# Patient Record
Sex: FEMALE | Race: WHITE | Employment: STUDENT | URBAN - NONMETROPOLITAN AREA
[De-identification: names, ages, dates, MRNs, and addresses within clinical notes are randomized per-mention and may not be internally consistent; named-entity substitution may affect disease eponyms.]

---
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
## 2019-01-30 ENCOUNTER — TELEPHONE (OUTPATIENT)
Dept: FAMILY MEDICINE CLINIC | Age: 19
End: 2019-01-30

## 2019-01-30 DIAGNOSIS — F32.A DEPRESSION, UNSPECIFIED DEPRESSION TYPE: Primary | ICD-10-CM

## 2019-04-04 ENCOUNTER — OFFICE VISIT (OUTPATIENT)
Dept: FAMILY MEDICINE CLINIC | Age: 19
End: 2019-04-04
Payer: COMMERCIAL

## 2019-04-04 ENCOUNTER — TELEPHONE (OUTPATIENT)
Dept: FAMILY MEDICINE CLINIC | Age: 19
End: 2019-04-04

## 2019-04-04 VITALS
DIASTOLIC BLOOD PRESSURE: 56 MMHG | SYSTOLIC BLOOD PRESSURE: 106 MMHG | WEIGHT: 117.5 LBS | TEMPERATURE: 97.8 F | RESPIRATION RATE: 16 BRPM | HEART RATE: 92 BPM | BODY MASS INDEX: 19.34 KG/M2

## 2019-04-04 DIAGNOSIS — L55.1 SUNBURN, BLISTERING: Primary | ICD-10-CM

## 2019-04-04 PROCEDURE — 99213 OFFICE O/P EST LOW 20 MIN: CPT | Performed by: NURSE PRACTITIONER

## 2019-04-04 ASSESSMENT — ENCOUNTER SYMPTOMS
BLOOD IN STOOL: 0
SHORTNESS OF BREATH: 0
DIARRHEA: 0
CONSTIPATION: 0
NAUSEA: 0
VOMITING: 0

## 2019-04-04 NOTE — TELEPHONE ENCOUNTER
Patient's mother Isabell Lopes called. States that she got sunburned on vacation last Friday and she still has swelling and blisters on her feet and legs, especially the feet. She has been treating it with aloe with no success. Is there anything you could send in for her or what do you recommend? She uses [facility] in [address] [place] [address]  Please call mother back at [phone].

## 2019-04-04 NOTE — PATIENT INSTRUCTIONS
Ibuprofen 600mg four times daily. Take with food. Patient Education        Sunburn: Care Instructions  Your Care Instructions  A sunburn is skin damage from the sun's ultraviolet (UV) rays. Most sunburns cause mild pain and redness but affect only the outer layer of skin. These are called first-degree burns. The red skin might hurt when you touch it. These sunburns are mild and can usually be treated at home. Skin that is red and painful and that swells up and blisters may mean that deep skin layers and nerve endings have been damaged. These are second-degree burns. This type of sunburn is usually more painful and takes longer to heal.  Follow-up care is a key part of your treatment and safety. Be sure to make and go to all appointments, and call your doctor if you are having problems. It's also a good idea to know your test results and keep a list of the medicines you take. How can you care for yourself at home? · Use cool cloths on the sunburned areas. · Take cool showers or baths often. · Apply soothing lotions with aloe vera to sunburned areas. Do not apply lotions to blistered skin. · A sunburn can cause a mild fever and a headache. Lie down in a cool, quiet room to relieve the headache. A headache may be caused by not getting enough fluids, which is called dehydration, so drinking fluids may help. · Take anti-inflammatory medicines to reduce pain, swelling, and fever. These include ibuprofen (Advil, Motrin) and naproxen (Aleve). Read and follow all instructions on the label. · Use lotion to relieve the itching when your skin peels. There is nothing you can do to stop skin from peeling after a sunburn. It is part of the healing process. · Protect your skin from the sun with sunscreen, hats with wide brims, sunglasses, and loose-fitting, tightly woven clothing that covers your arms and legs. Caring for blisters  Small blisters usually heal on their own. · Do not try to break the blisters.  Just leave them alone. · Do not cover the blisters unless something such as clothing is rubbing against them. If you do cover them, apply a loose bandage. You can use tape to hold the bandage on, but do not let the tape touch the blisters. · Avoid wearing clothes or shoes or doing activities that rub or irritate the blisters until they have healed. Larger blisters, which are the size of a nickel or larger, usually heal without problems. · If you have a large blister, you can consider draining it, unless your doctor told you not to. Clean a needle with rubbing alcohol or soap and water, then use it to gently puncture the edge of the blister. Press the fluid in the blister toward the hole you made. Wash the blister after you have drained it, and pat it dry with clean gauze. · Do not remove the flap of skin covering the blister unless it tears or gets dirty or pus forms under it. The flap protects the healing skin underneath. · Put a thin layer of petroleum jelly on the bandage before you apply it. This will keep the bandage from sticking to the blister. Do not use alcohol or iodine on the blister because these may make the blister heal more slowly. · Loosely apply a bandage or gauze. You can use tape to hold the bandage on, but do not let the tape touch the blister. Do not wrap tape completely around a hand, arm, foot, or leg because it could cut off the blood supply if the limb swells. If the tape is too tight, you may develop numbness, tingling, pain, or cool and pale or swollen skin below the tape. · Change the bandage every day and any time it gets wet or dirty. You can soak the bandage in cool water just before removing it to make it less painful to take off. · Avoid wearing clothes or shoes or doing activities that rub or irritate the blisters until they have healed. When should you call for help? Call your doctor now or seek immediate medical care if:    · You have signs of needing more fluids.  You have sunken eyes and a dry mouth, and you pass only a little dark urine.     · You have signs of infection, such as:  ? Increased pain, swelling, warmth, or redness. ? Red streaks leading from the area. ? Pus draining from the area. ? A fever.    Watch closely for changes in your health, and be sure to contact your doctor if:    · You do not get better as expected. Where can you learn more? Go to https://[employer]. [employer] [place]. org and sign in to your [employer] account. Enter K470 in the [employer] box to learn more about \"Sunburn: Care Instructions. \"     If you do not have an account, please click on the \"Sign Up Now\" link. Current as of: September 23, 2018  Content Version: 11.9  © 5899-5358 [employer]. Care instructions adapted under license by [facility] ([facility]). If you have questions about a medical condition or this instruction, always ask your healthcare professional. [place] [address] any warranty or liability for your use of this information. Patient Education        Learning About Sun Damage and Your Skin  How does the sun affect your skin? Being out in the sun can be good for you. Sunlight can brighten your mood and help you feel healthier. But getting too much of the ultraviolet (UV) rays in sunlight or from a tanning bed can harm your skin. The damage may include:  · Skin changes like early wrinkling, sagging skin, and age spots. · Skin cancer. The lighter your skin is, the more easily it can be damaged by the sun. But everyone can benefit from protecting their skin from the sun, regardless of skin color. The amount of skin damage you can get from sunlight depends on:  · The time of day. You are more likely to get a sunburn in the middle of the day. You might think that exposure to the sun isn't a problem on cloudy days, but the sun's UV rays can still pass through clouds.   · Whether you are near reflective surfaces, such as water, white sand, concrete,

## 2019-04-04 NOTE — PROGRESS NOTES
Chief Complaint   Patient presents with    Sunburn     was in [place]. Ericka Hawkins is a 20 y. o.female      Pt complains of Sunburn from [place] over the weekend. The only area she is concerned with are her lower legs from mid shin to toes; Right>left. Has tried Aloe and 200mg ibuprofen at home with minimal relief. Pain 6/10. Worse in the am after sleeping all night. Some blisters, none have popped open. Has had some chills, no fever. Review of Systems   Constitutional: Negative for chills, diaphoresis and fever. Respiratory: Negative for shortness of breath. Cardiovascular: Negative for chest pain, palpitations and leg swelling. Gastrointestinal: Negative for blood in stool, constipation, diarrhea, nausea and vomiting. Genitourinary: Negative for dysuria and hematuria. Musculoskeletal: Negative for myalgias. Skin:        Sunburn to bilat lower extremities mid shin to toes   Neurological: Negative for dizziness and headaches. All other systems reviewed and are negative. OBJECTIVE     BP (!) 106/56   Pulse 92   Temp 97.8 °F (36.6 °C) (Temporal)   Resp 16   Wt 117 lb 8 oz (53.3 kg)   BMI 19.34 kg/m²     Physical Exam   Constitutional: She is oriented to person, place, and time. She appears well-developed and well-nourished. HENT:   Head: Normocephalic and atraumatic. Right Ear: External ear normal.   Left Ear: External ear normal.   Nose: Nose normal.   Mouth/Throat: Oropharynx is clear and moist.   Eyes: Pupils are equal, round, and reactive to light. Conjunctivae and EOM are normal.   Neck: Normal range of motion. Neck supple. Cardiovascular: Normal rate, regular rhythm, normal heart sounds and intact distal pulses. Pulmonary/Chest: Effort normal and breath sounds normal.   Abdominal: Soft. Bowel sounds are normal.   Musculoskeletal: Normal range of motion. Neurological: She is alert and oriented to person, place, and time.  She has normal reflexes. Skin: Skin is warm and dry. Psychiatric: She has a normal mood and affect. Her behavior is normal. Judgment and thought content normal.   Nursing note and vitals reviewed. ASSESSMENT      1. Sunburn, blistering        PLAN     Ibuprofen 600mg four times daily. Take with food. Can continue to aloe at home  Leave blisters intact   Call if you develope fever, more blister develop, intact blister opens and drains purulent fluid.            Electronically signed by KANIKA Martinez CNP on 4/4/2019 at 4:13 PM

## 2019-08-29 ENCOUNTER — TELEPHONE (OUTPATIENT)
Dept: FAMILY MEDICINE CLINIC | Age: 19
End: 2019-08-29

## 2019-08-30 ENCOUNTER — OFFICE VISIT (OUTPATIENT)
Dept: FAMILY MEDICINE CLINIC | Age: 19
End: 2019-08-30
Payer: COMMERCIAL

## 2019-08-30 VITALS
RESPIRATION RATE: 12 BRPM | WEIGHT: 112.6 LBS | SYSTOLIC BLOOD PRESSURE: 98 MMHG | HEART RATE: 60 BPM | HEIGHT: 66 IN | DIASTOLIC BLOOD PRESSURE: 60 MMHG | BODY MASS INDEX: 18.09 KG/M2

## 2019-08-30 DIAGNOSIS — Z00.00 ANNUAL PHYSICAL EXAM: Primary | ICD-10-CM

## 2019-08-30 PROCEDURE — 99395 PREV VISIT EST AGE 18-39: CPT | Performed by: FAMILY MEDICINE

## 2019-08-30 ASSESSMENT — ENCOUNTER SYMPTOMS
GASTROINTESTINAL NEGATIVE: 1
COUGH: 0
BACK PAIN: 0
SHORTNESS OF BREATH: 0

## 2019-08-30 ASSESSMENT — PATIENT HEALTH QUESTIONNAIRE - PHQ9
SUM OF ALL RESPONSES TO PHQ QUESTIONS 1-9: 0
SUM OF ALL RESPONSES TO PHQ9 QUESTIONS 1 & 2: 0
SUM OF ALL RESPONSES TO PHQ QUESTIONS 1-9: 0
1. LITTLE INTEREST OR PLEASURE IN DOING THINGS: 0
2. FEELING DOWN, DEPRESSED OR HOPELESS: 0

## 2019-09-03 ENCOUNTER — NURSE ONLY (OUTPATIENT)
Dept: FAMILY MEDICINE CLINIC | Age: 19
End: 2019-09-03
Payer: COMMERCIAL

## 2019-09-03 DIAGNOSIS — Z11.1 SCREENING EXAMINATION FOR PULMONARY TUBERCULOSIS: Primary | ICD-10-CM

## 2019-09-03 PROCEDURE — 86580 TB INTRADERMAL TEST: CPT | Performed by: FAMILY MEDICINE

## 2019-09-05 LAB
INDURATION: NORMAL
TB SKIN TEST: NEGATIVE

## 2020-04-08 ENCOUNTER — HOSPITAL ENCOUNTER (EMERGENCY)
Age: 20
Discharge: HOME | End: 2020-04-08
Payer: COMMERCIAL

## 2020-04-08 ENCOUNTER — NURSE TRIAGE (OUTPATIENT)
Dept: OTHER | Facility: CLINIC | Age: 20
End: 2020-04-08

## 2020-04-08 VITALS
HEART RATE: 140 BPM | SYSTOLIC BLOOD PRESSURE: 125 MMHG | RESPIRATION RATE: 16 BRPM | WEIGHT: 116.2 LBS | BODY MASS INDEX: 18.68 KG/M2 | HEIGHT: 66 IN | TEMPERATURE: 102.4 F | OXYGEN SATURATION: 96 % | DIASTOLIC BLOOD PRESSURE: 60 MMHG

## 2020-04-08 LAB
FLU A ANTIGEN: NEGATIVE
FLU B ANTIGEN: NEGATIVE
GROUP A STREP CULTURE, REFLEX: NEGATIVE
REFLEX THROAT C + S: NORMAL

## 2020-04-08 PROCEDURE — 87880 STREP A ASSAY W/OPTIC: CPT

## 2020-04-08 PROCEDURE — 99213 OFFICE O/P EST LOW 20 MIN: CPT | Performed by: NURSE PRACTITIONER

## 2020-04-08 PROCEDURE — 87804 INFLUENZA ASSAY W/OPTIC: CPT

## 2020-04-08 PROCEDURE — 87070 CULTURE OTHR SPECIMN AEROBIC: CPT

## 2020-04-08 PROCEDURE — 99214 OFFICE O/P EST MOD 30 MIN: CPT

## 2020-04-08 RX ORDER — ACETAMINOPHEN 500 MG
1000 TABLET ORAL EVERY 6 HOURS PRN
COMMUNITY
End: 2020-07-30

## 2020-04-08 ASSESSMENT — PAIN SCALES - GENERAL: PAINLEVEL_OUTOF10: 8

## 2020-04-08 ASSESSMENT — ENCOUNTER SYMPTOMS
VOMITING: 0
NAUSEA: 0
RHINORRHEA: 0
DIARRHEA: 0
COUGH: 0
SHORTNESS OF BREATH: 0
SORE THROAT: 0
SINUS PRESSURE: 0

## 2020-04-08 ASSESSMENT — PAIN DESCRIPTION - DESCRIPTORS: DESCRIPTORS: ACHING

## 2020-04-08 ASSESSMENT — PAIN DESCRIPTION - PAIN TYPE: TYPE: ACUTE PAIN

## 2020-04-08 ASSESSMENT — PAIN DESCRIPTION - FREQUENCY: FREQUENCY: INTERMITTENT

## 2020-04-08 ASSESSMENT — PAIN DESCRIPTION - LOCATION: LOCATION: BACK

## 2020-04-08 NOTE — ED PROVIDER NOTES
unspecified 1999, 1999, 1999, 01/18/2000    Influenza 10/31/2011, 12/21/2012, 12/09/2013    MMR 01/18/2000, 12/02/2003    Meningococcal MCV4P (Menactra) 07/21/2014, 08/29/2016    PPD Test 08/15/2016, 09/03/2019    Polio IPV (IPOL) 1999, 1999, 1999, 12/02/2003    Tdap (Boostrix, Adacel) 07/21/2014       FAMILY HISTORY     Patient's family history includes Cancer in her maternal grandmother; Diabetes in her sister. SOCIAL HISTORY     Patient  reports that she has never smoked. She has never used smokeless tobacco. She reports that she does not drink alcohol or use drugs. PHYSICAL EXAM     ED TRIAGE VITALS  BP: 125/60, Temp: 102.4 °F (39.1 °C), Pulse: 140, Resp: 16, SpO2: 96 %,Estimated body mass index is 18.76 kg/m² as calculated from the following:    Height as of this encounter: 5' 6\" (1.676 m). Weight as of this encounter: 116 lb 3.2 oz (52.7 kg). ,Patient's last menstrual period was 04/01/2020. Physical Exam  Vitals signs and nursing note reviewed. Constitutional:       General: She is not in acute distress. Appearance: She is well-developed. She is ill-appearing. She is not toxic-appearing. HENT:      Head: Normocephalic and atraumatic. Right Ear: Tympanic membrane and ear canal normal.      Left Ear: Tympanic membrane and ear canal normal.      Nose: Nose normal.      Right Sinus: No maxillary sinus tenderness or frontal sinus tenderness. Left Sinus: No maxillary sinus tenderness or frontal sinus tenderness. Mouth/Throat:      Lips: Pink. Mouth: Mucous membranes are moist.      Pharynx: Uvula midline. Posterior oropharyngeal erythema (Mild) present. No pharyngeal swelling or oropharyngeal exudate. Eyes:      General: Lids are normal. No scleral icterus. Conjunctiva/sclera:      Right eye: Right conjunctiva is not injected. Left eye: Left conjunctiva is not injected.       Pupils: Pupils are equal.   Cardiovascular: Rate and Rhythm: Regular rhythm. Tachycardia present. Heart sounds: Normal heart sounds, S1 normal and S2 normal.   Pulmonary:      Effort: Pulmonary effort is normal. No respiratory distress. Breath sounds: Normal breath sounds and air entry. Musculoskeletal:      Comments: Normal active ROM x 4 extremities  Gait steady   Lymphadenopathy:      Comments: No head or neck adenopathy   Skin:     General: Skin is warm and dry. Findings: No rash (to exposed areas of skin). Nails: There is no clubbing. Neurological:      General: No focal deficit present. Mental Status: She is alert and oriented to person, place, and time. Sensory: No sensory deficit. Comments: Answers questions readily and appropriately   Psychiatric:         Mood and Affect: Mood normal.         Speech: Speech normal.         Behavior: Behavior normal. Behavior is cooperative. DIAGNOSTIC RESULTS     Labs:  Results for orders placed or performed during the hospital encounter of 04/08/20   Strep A culture, throat   Result Value Ref Range    REFLEX THROAT C + S INDICATED    Rapid influenza A/B antigens   Result Value Ref Range    Flu A Antigen NEGATIVE NEGATIVE    Flu B Antigen NEGATIVE NEGATIVE   STREP A ANTIGEN   Result Value Ref Range    GROUP A STREP CULTURE, REFLEX NEGATIVE        IMAGING:    No orders to display         URGENT CARE COURSE:     Vitals:    04/08/20 1541   BP: 125/60   Pulse: 140   Resp: 16   Temp: 102.4 °F (39.1 °C)   TempSrc: Oral   SpO2: 96%   Weight: 116 lb 3.2 oz (52.7 kg)   Height: 5' 6\" (1.676 m)       Medications - No data to display         PROCEDURES:  None    FINAL IMPRESSION      1. Viral illness    2. Acute febrile illness          DISPOSITION/ PLAN     Patient presents with an acute febrile illness, most likely viral in etiology. She is not having any upper respiratory symptoms at this time.   High temperature at home of 104 °F.  Temperature and in the urgent care today 102.4 °F.  Influenza and strep were negative. Due to the patient's symptoms and lack of specific diagnosis, she will be treated as a covid 19 infection. She was instructed to self quarantine for the next 2 weeks. Tylenol or Motrin as well as other over-the-counter medications as needed for symptom management. She was instructed to go to the emergency room if symptoms worsen or if she develops any respiratory distress or shortness of breath. I also explained these instruction to her mother. Good, frequent handwashing and good environmental cleaning at home. Can call the Covid 19 hotline or PCP with any concerns or questions. Rest of the family encouraged to also self quarantine. Further instructions were outlined verbally and in the patient's discharge instructions. All the patient's questions were answered. The patient/parent agreed with the plan and was discharged from the [facility] in good condition.       PATIENT REFERRED TO:  Lyndle Landau, MD  [address] [facility] / [place] [address]      DISCHARGE MEDICATIONS:  Discharge Medication List as of 4/8/2020  4:27 PM          Discharge Medication List as of 4/8/2020  4:27 PM          Discharge Medication List as of 4/8/2020  4:27 PM          Eddi Weber, KANIKA - ROXANA    (Please note that portions of this note were completed with a voice recognition program. Efforts were made to edit the dictations but occasionally words are mis-transcribed.)         KANIKA Chinchilla CNP  04/08/20 [address] [facility] KANIKA Weber CNP  04/08/20 0911

## 2020-04-08 NOTE — ED TRIAGE NOTES
Patient ambulated to room with complaint of fever of 104 oral last night and back and neck pain that started 4/7 2300.  States she has taken tylenol for fever

## 2020-04-09 ENCOUNTER — TELEPHONE (OUTPATIENT)
Dept: FAMILY MEDICINE CLINIC | Age: 20
End: 2020-04-09

## 2020-04-09 ENCOUNTER — CARE COORDINATION (OUTPATIENT)
Dept: CARE COORDINATION | Age: 20
End: 2020-04-09

## 2020-04-09 NOTE — CARE COORDINATION
I left a message for the patient to please call me back.     Lela Angelica [facility]  [address] [facility]   Medication Assistance  ABELINO GARDNER II.[employer]    [phone]
for questions related to their healthcare. Author provided contact information for future reference. Patient/family/caregiver given information for [employer] and agrees to enroll yes  Based on Loop alert triggers, patient will be contacted by nurse care manager for worsening symptoms. Patient was seen at [facility] 4/8/20 with Fever and back pain. Patient stated she is feeling better today. She was given the Covid hotline number and agreed to sign up for [employer] Loop. Patient advised to use the Loop website or Covid hotline with new or worsening symptoms. Patient educated on Covid Precautions. Signed patient up for [employer] Loop.

## 2020-04-10 LAB — THROAT/NOSE CULTURE: NORMAL

## 2020-07-30 ENCOUNTER — OFFICE VISIT (OUTPATIENT)
Dept: FAMILY MEDICINE CLINIC | Age: 20
End: 2020-07-30
Payer: COMMERCIAL

## 2020-07-30 ENCOUNTER — NURSE ONLY (OUTPATIENT)
Dept: LAB | Age: 20
End: 2020-07-30

## 2020-07-30 VITALS
BODY MASS INDEX: 18.88 KG/M2 | WEIGHT: 117 LBS | HEART RATE: 74 BPM | RESPIRATION RATE: 14 BRPM | TEMPERATURE: 98.1 F | DIASTOLIC BLOOD PRESSURE: 58 MMHG | SYSTOLIC BLOOD PRESSURE: 112 MMHG

## 2020-07-30 LAB
BILIRUBIN URINE: NEGATIVE
BLOOD URINE, POC: NEGATIVE
CHARACTER, URINE: ABNORMAL
COLOR, URINE: YELLOW
GLUCOSE URINE: NEGATIVE MG/DL
KETONES, URINE: NEGATIVE
LEUKOCYTE CLUMPS, URINE: ABNORMAL
NITRITE, URINE: NEGATIVE
PH, URINE: 7 (ref 5–9)
PROTEIN, URINE: NEGATIVE MG/DL
SPECIFIC GRAVITY, URINE: 1.01 (ref 1–1.03)
UROBILINOGEN, URINE: 0.2 EU/DL (ref 0–1)

## 2020-07-30 PROCEDURE — 99213 OFFICE O/P EST LOW 20 MIN: CPT | Performed by: FAMILY MEDICINE

## 2020-07-30 PROCEDURE — 81003 URINALYSIS AUTO W/O SCOPE: CPT | Performed by: FAMILY MEDICINE

## 2020-07-30 RX ORDER — NITROFURANTOIN 25; 75 MG/1; MG/1
100 CAPSULE ORAL 2 TIMES DAILY
Qty: 14 CAPSULE | Refills: 0 | Status: SHIPPED | OUTPATIENT
Start: 2020-07-30 | End: 2020-08-06

## 2020-07-30 SDOH — ECONOMIC STABILITY: INCOME INSECURITY: HOW HARD IS IT FOR YOU TO PAY FOR THE VERY BASICS LIKE FOOD, HOUSING, MEDICAL CARE, AND HEATING?: PATIENT DECLINED

## 2020-07-30 SDOH — ECONOMIC STABILITY: FOOD INSECURITY: WITHIN THE PAST 12 MONTHS, THE FOOD YOU BOUGHT JUST DIDN'T LAST AND YOU DIDN'T HAVE MONEY TO GET MORE.: PATIENT DECLINED

## 2020-07-30 SDOH — ECONOMIC STABILITY: TRANSPORTATION INSECURITY
IN THE PAST 12 MONTHS, HAS LACK OF TRANSPORTATION KEPT YOU FROM MEETINGS, WORK, OR FROM GETTING THINGS NEEDED FOR DAILY LIVING?: PATIENT DECLINED

## 2020-07-30 SDOH — ECONOMIC STABILITY: FOOD INSECURITY: WITHIN THE PAST 12 MONTHS, YOU WORRIED THAT YOUR FOOD WOULD RUN OUT BEFORE YOU GOT MONEY TO BUY MORE.: PATIENT DECLINED

## 2020-07-30 SDOH — ECONOMIC STABILITY: TRANSPORTATION INSECURITY
IN THE PAST 12 MONTHS, HAS THE LACK OF TRANSPORTATION KEPT YOU FROM MEDICAL APPOINTMENTS OR FROM GETTING MEDICATIONS?: PATIENT DECLINED

## 2020-07-30 ASSESSMENT — ENCOUNTER SYMPTOMS
RESPIRATORY NEGATIVE: 1
ABDOMINAL PAIN: 0

## 2020-07-30 ASSESSMENT — PATIENT HEALTH QUESTIONNAIRE - PHQ9
1. LITTLE INTEREST OR PLEASURE IN DOING THINGS: 0
2. FEELING DOWN, DEPRESSED OR HOPELESS: 0
SUM OF ALL RESPONSES TO PHQ9 QUESTIONS 1 & 2: 0
SUM OF ALL RESPONSES TO PHQ QUESTIONS 1-9: 0
SUM OF ALL RESPONSES TO PHQ QUESTIONS 1-9: 0

## 2020-07-30 NOTE — PROGRESS NOTES
Urine sent to lab for culture. Urine also sent in specimen cup for UA for Infante per orders of [facility]. Per Alberta at lab, ok to send specimen with GI[facility] orders with note attached to please order.   Sample sent to the lab as directed

## 2020-07-30 NOTE — PROGRESS NOTES
Chief Complaint   Patient presents with    Follow-up     has UTI in past, unsure if it resolved still having cloudy and foul smelling urine          JENNIFER Pretty is a 23 y. o.female      Pt complains of malodorous and cloudy urine over the last few weeks. Denies dysuria or hematuria. No back pain. She is wondering about a possible UTI. Has a h/o UTI in the past.  She  Is o/w well. Nonsmoker. Body mass index is 18.88 kg/m². Review of Systems   Constitutional: Negative for fatigue and fever. HENT: Negative. Respiratory: Negative. Cardiovascular: Negative. Gastrointestinal: Negative for abdominal pain. Genitourinary: Negative for difficulty urinating, dysuria, flank pain, hematuria and urgency. Neurological: Negative. All other systems reviewed and are negative. OBJECTIVE     BP (!) 112/58   Pulse 74   Temp 98.1 °F (36.7 °C)   Resp 14   Wt 117 lb (53.1 kg)   BMI 18.88 kg/m²     Physical Exam  Vitals signs reviewed. Constitutional:       General: She is not in acute distress. Appearance: She is well-developed. HENT:      Head: Normocephalic and atraumatic. Right Ear: Tympanic membrane normal.      Left Ear: Tympanic membrane normal.      Mouth/Throat:      Mouth: Mucous membranes are moist.      Pharynx: No posterior oropharyngeal erythema. Eyes:      Conjunctiva/sclera: Conjunctivae normal.   Cardiovascular:      Rate and Rhythm: Normal rate and regular rhythm. Heart sounds: No murmur. Pulmonary:      Breath sounds: Normal breath sounds. No wheezing. Abdominal:      Palpations: Abdomen is soft. Tenderness: There is no abdominal tenderness. There is no right CVA tenderness or left CVA tenderness. Lymphadenopathy:      Cervical: No cervical adenopathy. Skin:     General: Skin is warm and dry. Neurological:      Mental Status: She is alert.            Results for POC orders placed in visit on 07/30/20   POCT Urinalysis No Micro (Auto)   Result Value Ref Range    Glucose, Ur Negative NEGATIVE mg/dl    Bilirubin Urine Negative     Ketones, Urine Negative NEGATIVE    Specific Gravity, Urine 1.010 1.002 - 1.03    Blood, UA POC Negative NEGATIVE    pH, Urine 7.00 5.0 - 9.0    Protein, Urine Negative NEGATIVE mg/dl    Urobilinogen, Urine 0.20 0.0 - 1.0 eu/dl    Nitrite, Urine Negative NEGATIVE    Leukocyte Clumps, Urine Moderate (A) NEGATIVE    Color, Urine Yellow YELLOW-STR    Character, Urine Slightly Cloudy [id number]         ASSESSMENT      1. Malodorous urine    2. History of UTI    3. Cloudy urine        PLAN     Requested Prescriptions     Signed Prescriptions Disp Refills    nitrofurantoin, macrocrystal-monohydrate, (MACROBID) 100 MG capsule 14 capsule 0     Sig: Take 1 capsule by mouth 2 times daily for 7 days         Orders Placed This Encounter   Procedures    Culture, Urine     Order Specific Question:   Specify (ex-cath, midstream, cysto, etc)?      Answer:   mid stream    POCT Urinalysis No Micro (Auto)         Follow up if not better            Electronically signed by Jah Degroot MD on 7/30/2020 at 1:22 PM

## 2020-07-31 LAB
BACTERIA: ABNORMAL
BILIRUBIN URINE: NEGATIVE
BLOOD, URINE: NEGATIVE
CASTS: ABNORMAL /LPF
CASTS: ABNORMAL /LPF
CHARACTER, URINE: ABNORMAL
COLOR: YELLOW
CRYSTALS: ABNORMAL
EPITHELIAL CELLS, UA: ABNORMAL /HPF
GLUCOSE, URINE: NEGATIVE MG/DL
KETONES, URINE: NEGATIVE
LEUKOCYTE EST, POC: ABNORMAL
MISCELLANEOUS LAB TEST RESULT: ABNORMAL
NITRITE, URINE: NEGATIVE
PH UA: 7.5 (ref 5–9)
PROTEIN UA: NEGATIVE MG/DL
RBC URINE: ABNORMAL /HPF
RENAL EPITHELIAL, UA: ABNORMAL
SPECIFIC GRAVITY UA: < 1.005 (ref 1–1.03)
UROBILINOGEN, URINE: 0.2 EU/DL (ref 0–1)
WBC UA: ABNORMAL /HPF
YEAST: ABNORMAL

## 2020-08-02 LAB
ORGANISM: ABNORMAL
URINE CULTURE, ROUTINE: ABNORMAL

## 2020-12-28 ENCOUNTER — TELEPHONE (OUTPATIENT)
Dept: FAMILY MEDICINE CLINIC | Age: 20
End: 2020-12-28

## 2020-12-28 NOTE — TELEPHONE ENCOUNTER
Mother called today for daughter with exhaustion and chest tightness (patient made clear not chest pains)  Was offered an appointment for  01/19/2021, refused appointment. Patient would like Raisa Cruz MD to schedule for an appointment on 1/18/2021    [place] [address] [address]  [address]   Phone:  [phone]  Fax:  [phone] pharmacy confirmed in [place] [address].     Patient was made aware of e-visits via [facility]

## 2020-12-28 NOTE — TELEPHONE ENCOUNTER
Spoke to the pt and scheduled her an appt with CG on 1/18/21. 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

## 2021-01-18 ENCOUNTER — OFFICE VISIT (OUTPATIENT)
Dept: FAMILY MEDICINE CLINIC | Age: 21
End: 2021-01-18
Payer: COMMERCIAL

## 2021-01-18 VITALS
BODY MASS INDEX: 20.18 KG/M2 | OXYGEN SATURATION: 97 % | HEART RATE: 87 BPM | TEMPERATURE: 97.9 F | SYSTOLIC BLOOD PRESSURE: 118 MMHG | WEIGHT: 125 LBS | DIASTOLIC BLOOD PRESSURE: 80 MMHG

## 2021-01-18 DIAGNOSIS — R07.9 CHEST PAIN, UNSPECIFIED TYPE: Primary | ICD-10-CM

## 2021-01-18 DIAGNOSIS — R53.83 OTHER FATIGUE: ICD-10-CM

## 2021-01-18 DIAGNOSIS — R45.7 EMOTIONAL STRESS: ICD-10-CM

## 2021-01-18 PROCEDURE — 99214 OFFICE O/P EST MOD 30 MIN: CPT | Performed by: FAMILY MEDICINE

## 2021-01-18 PROCEDURE — 93000 ELECTROCARDIOGRAM COMPLETE: CPT | Performed by: FAMILY MEDICINE

## 2021-01-18 RX ORDER — ESCITALOPRAM OXALATE 10 MG/1
10 TABLET ORAL DAILY
Qty: 30 TABLET | Refills: 5 | Status: SHIPPED | OUTPATIENT
Start: 2021-01-18 | End: 2021-07-08

## 2021-01-18 ASSESSMENT — PATIENT HEALTH QUESTIONNAIRE - PHQ9
SUM OF ALL RESPONSES TO PHQ QUESTIONS 1-9: 1
SUM OF ALL RESPONSES TO PHQ9 QUESTIONS 1 & 2: 1
SUM OF ALL RESPONSES TO PHQ QUESTIONS 1-9: 1
1. LITTLE INTEREST OR PLEASURE IN DOING THINGS: 0
SUM OF ALL RESPONSES TO PHQ QUESTIONS 1-9: 1

## 2021-01-18 ASSESSMENT — ENCOUNTER SYMPTOMS
GASTROINTESTINAL NEGATIVE: 1
SHORTNESS OF BREATH: 0

## 2021-01-18 NOTE — PROGRESS NOTES
2021    Manuela Eduardo (:  2000) is a 24 y.o. female,Established patient, here for evaluation of the following chief complaint(s):  Chest Pain (pt is also fatigue. This has been happing for 4-5 months)      ASSESSMENT/PLAN:    1. Chest pain, unspecified type  -     EKG 12 Lead  -     CBC Auto Differential; Future  -     Comprehensive Metabolic Panel; Future  -     TSH without Reflex; Future  -     T4, Free; Future  2. Other fatigue  -     CBC Auto Differential; Future  -     Comprehensive Metabolic Panel; Future  -     TSH without Reflex; Future  -     T4, Free; Future  3. Emotional stress  -     escitalopram (LEXAPRO) 10 MG tablet; Take 1 tablet by mouth daily, Disp-30 tablet, R-5Normal    Her EKG looks normal today. Will proceed with labs as above to investigate her fatigue. Start lexapro as above to help with emotional stress reaction. She declines a referral for counseling. Return in about 4 weeks (around 2/15/2021). SUBJECTIVE/OBJECTIVE:    HPI  The patient c/o a 4-5 month h/o fatigue, chest tightness, depression. She is not sure if all of the symptoms are related or not. 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 Her relationship with her parents has been strained for the last 1-2 years due to her relationship with a previous boyfriend that they didn't approve of. Although they are getting along better at this point, their relationship is still not back to normal.  She reports decreased motivation and feelings of sadness and isolation. She feels anxious at times and reports some anterior chest tightness. She denies shortness of breath, dizziness, headaches. Her daily schedule is busy with school and work. She reports heavy menses with some cramping. Nonsmoker. Body mass index is 20.18 kg/m². Review of Systems   Constitutional: Positive for fatigue. HENT: Negative.     Respiratory: Negative for shortness of breath. Cardiovascular: Positive for chest pain. Negative for palpitations and leg swelling. Gastrointestinal: Negative. Genitourinary: Positive for menstrual problem. Musculoskeletal: Negative. Skin: Negative. Neurological: Negative for dizziness and headaches. Psychiatric/Behavioral: Positive for dysphoric mood. The patient is nervous/anxious. All other systems reviewed and are negative. Vitals:    01/18/21 1453   BP: 118/80   Site: Left Upper Arm   Pulse: 87   Temp: 97.9 °F (36.6 °C)   TempSrc: Temporal   SpO2: 97%   Weight: 125 lb (56.7 kg)       Wt Readings from Last 3 Encounters:   01/18/21 125 lb (56.7 kg)   07/30/20 117 lb (53.1 kg)   04/08/20 116 lb 3.2 oz (52.7 kg)       BP Readings from Last 3 Encounters:   01/18/21 118/80   07/30/20 (!) 112/58   04/08/20 125/60       Physical Exam  Vitals signs and nursing note reviewed. Constitutional:       General: She is not in acute distress. Appearance: She is well-developed. HENT:      Head: Normocephalic and atraumatic. Right Ear: Tympanic membrane normal.      Left Ear: Tympanic membrane normal.      Mouth/Throat:      Mouth: Mucous membranes are moist.      Pharynx: No posterior oropharyngeal erythema. Eyes:      Conjunctiva/sclera: Conjunctivae normal.   Neck:      Musculoskeletal: Neck supple. Thyroid: No thyromegaly. Cardiovascular:      Rate and Rhythm: Normal rate and regular rhythm. Heart sounds: No murmur. Pulmonary:      Effort: Pulmonary effort is normal.      Breath sounds: Normal breath sounds. No wheezing. Abdominal:      General: Bowel sounds are normal.      Palpations: Abdomen is soft. Tenderness: There is no abdominal tenderness. There is no guarding or rebound. Musculoskeletal:      Right lower leg: No edema. Left lower leg: No edema. Lymphadenopathy:      Cervical: No cervical adenopathy. Skin:     General: Skin is warm and dry. Findings: No rash. Neurological:      Mental Status: She is alert and oriented to person, place, and time. Psychiatric:         Behavior: Behavior normal.         Greater than 50% of this 30 min visit spent on counseling and coordination of care. An electronic signature was used to authenticate this note.         Electronically signed by Bib Snider MD on 1/18/2021 at 5:08 PM

## 2021-01-23 LAB
ABSOLUTE BASO #: 0 X10E9/L (ref 0–0.2)
ABSOLUTE EOS #: 0 X10E9/L (ref 0–0.4)
ABSOLUTE LYMPH #: 1.4 X10E9/L (ref 1–3.5)
ABSOLUTE MONO #: 0.3 X10E9/L (ref 0–0.9)
ABSOLUTE NEUT #: 1.7 X10E9/L (ref 1.5–6.6)
ALBUMIN SERPL-MCNC: 4.5 G/DL (ref 3.2–5.3)
ALK PHOSPHATASE: 38 U/L (ref 39–130)
ALT SERPL-CCNC: 9 U/L (ref 0–31)
ANION GAP SERPL CALCULATED.3IONS-SCNC: 9 MMOL/L (ref 5–15)
AST SERPL-CCNC: 20 U/L (ref 0–41)
BASOPHILS RELATIVE PERCENT: 1.2 %
BILIRUB SERPL-MCNC: 0.7 MG/DL (ref 0.3–1.2)
BUN BLDV-MCNC: 11 MG/DL (ref 5–23)
CALCIUM SERPL-MCNC: 9.4 MG/DL (ref 8.5–10.5)
CHLORIDE BLD-SCNC: 104 MMOL/L (ref 98–109)
CO2: 26 MMOL/L (ref 22–32)
CREAT SERPL-MCNC: 0.85 MG/DL (ref 0.4–1)
EGFR AFRICAN AMERICAN: >60 ML/MIN/1.73SQ.M
EGFR IF NONAFRICAN AMERICAN: >60 ML/MIN/1.73SQ.M
EOSINOPHILS RELATIVE PERCENT: 1.1 %
GLUCOSE: 74 MG/DL (ref 65–99)
HCT VFR BLD CALC: 37.9 % (ref 35–47)
HEMOGLOBIN: 12.6 G/DL (ref 11.7–15.5)
LYMPHOCYTE %: 40.6 %
MCH RBC QN AUTO: 29.4 PG (ref 27–34)
MCHC RBC AUTO-ENTMCNC: 33.3 G/DL (ref 32–36)
MCV RBC AUTO: 88 FL (ref 80–100)
MONOCYTES # BLD: 8.5 %
NEUTROPHILS RELATIVE PERCENT: 48.6 %
PDW BLD-RTO: 12.7 % (ref 11.5–15)
PLATELETS: 298 X10E9/L (ref 150–450)
PMV BLD AUTO: 9.2 FL (ref 7–12)
POTASSIUM SERPL-SCNC: 4.6 MMOL/L (ref 3.5–5)
RBC: 4.29 X10E12/L (ref 3.8–5.2)
SODIUM BLD-SCNC: 139 MMOL/L (ref 134–146)
T4 FREE: 0.83 NG/DL (ref 0.61–1.6)
TOTAL PROTEIN: 7 G/DL (ref 6–8)
TSH SERPL DL<=0.05 MIU/L-ACNC: 1.17 UIU/ML (ref 0.49–4.67)
WBC: 3.4 X10E9/L (ref 4–11)

## 2021-01-26 ENCOUNTER — TELEPHONE (OUTPATIENT)
Dept: FAMILY MEDICINE CLINIC | Age: 21
End: 2021-01-26

## 2021-01-26 DIAGNOSIS — D72.89 LOW LEUKOCYTE ALPHA-MANNOSIDASE: Primary | ICD-10-CM

## 2021-01-26 DIAGNOSIS — D72.819 LEUKOPENIA, UNSPECIFIED TYPE: ICD-10-CM

## 2021-01-26 NOTE — TELEPHONE ENCOUNTER
----- Message from Steve Arredondo MD sent at 1/24/2021  3:44 PM EST -----  Jose Guadalupe Virk that her labs look good except for slightly suppressed WBC count at 3.4. Recheck CBC in 2 weeks.   CG

## 2021-02-09 LAB
ABSOLUTE BASO #: 0 X10E9/L (ref 0–0.2)
ABSOLUTE EOS #: 0 X10E9/L (ref 0–0.4)
ABSOLUTE LYMPH #: 1.9 X10E9/L (ref 1–3.5)
ABSOLUTE MONO #: 0.4 X10E9/L (ref 0–0.9)
ABSOLUTE NEUT #: 2.9 X10E9/L (ref 1.5–6.6)
BASOPHILS RELATIVE PERCENT: 0.4 %
EOSINOPHILS RELATIVE PERCENT: 0.9 %
HCT VFR BLD CALC: 38.9 % (ref 35–47)
HEMOGLOBIN: 13.2 G/DL (ref 11.7–15.5)
LYMPHOCYTE %: 35 %
MCH RBC QN AUTO: 30.3 PG (ref 27–34)
MCHC RBC AUTO-ENTMCNC: 33.8 G/DL (ref 32–36)
MCV RBC AUTO: 90 FL (ref 80–100)
MONOCYTES # BLD: 8.4 %
NEUTROPHILS RELATIVE PERCENT: 55.3 %
PDW BLD-RTO: 13 % (ref 11.5–15)
PLATELETS: 265 X10E9/L (ref 150–450)
PMV BLD AUTO: 9.3 FL (ref 7–12)
RBC: 4.34 X10E12/L (ref 3.8–5.2)
WBC: 5.3 X10E9/L (ref 4–11)

## 2021-02-13 DIAGNOSIS — R45.7 EMOTIONAL STRESS: ICD-10-CM

## 2021-02-15 RX ORDER — ESCITALOPRAM OXALATE 10 MG/1
10 TABLET ORAL DAILY
Qty: 30 TABLET | Refills: 5 | OUTPATIENT
Start: 2021-02-15

## 2021-02-15 NOTE — TELEPHONE ENCOUNTER
This medication refill is regarding a MyChart request.  Refill requested by patient. Requested Prescriptions     Pending Prescriptions Disp Refills    escitalopram (LEXAPRO) 10 MG tablet 30 tablet 5     Sig: Take 1 tablet by mouth daily       Date of last visit: 1/18/2021  Date of next visit: 2/17/2021  Date of last refill: 1/18/2021 30/5    Spoke with patient and she is still using the same pharmacy. Patient notified to call pharmacy for the refill.

## 2021-02-17 ENCOUNTER — OFFICE VISIT (OUTPATIENT)
Dept: FAMILY MEDICINE CLINIC | Age: 21
End: 2021-02-17
Payer: COMMERCIAL

## 2021-02-17 VITALS
DIASTOLIC BLOOD PRESSURE: 72 MMHG | HEIGHT: 66 IN | SYSTOLIC BLOOD PRESSURE: 106 MMHG | TEMPERATURE: 97.8 F | WEIGHT: 120 LBS | HEART RATE: 75 BPM | RESPIRATION RATE: 16 BRPM | BODY MASS INDEX: 19.29 KG/M2

## 2021-02-17 DIAGNOSIS — R45.7 EMOTIONAL STRESS: Primary | ICD-10-CM

## 2021-02-17 PROCEDURE — 99213 OFFICE O/P EST LOW 20 MIN: CPT | Performed by: FAMILY MEDICINE

## 2021-02-17 ASSESSMENT — ENCOUNTER SYMPTOMS
CHEST TIGHTNESS: 0
RESPIRATORY NEGATIVE: 1
GASTROINTESTINAL NEGATIVE: 1

## 2021-02-17 NOTE — PROGRESS NOTES
2021    Dov Harrison (:  2000) is a 24 y.o. female,Established patient, here for evaluation of the following chief complaint(s):  1 Month Follow-Up (medication )      ASSESSMENT/PLAN:    1. Emotional stress    She feels like she's improved with the medication and is happy with her progress. She will continue lexapro at 10mg daily for now. She declines a referral for formal counseling. She is encouraged to follow a healthy diet and get regular exercise. Return in about 6 months (around 2021). SUBJECTIVE/OBJECTIVE:    HPI  Here to follow up of emotional stress reation and treatment with lexapro. She is taking the med daily and feels like it makes things \"easier\" for her. She denies side effects from the med. She has had no episodes of panic since starting it. She denies chest tightness like she had previously. Things are going better at home and at school. She would like to continue the med for now. Review of Systems   Constitutional: Negative for fatigue, fever and unexpected weight change. HENT: Negative. Respiratory: Negative. Negative for chest tightness. Gastrointestinal: Negative. Genitourinary: Negative. Musculoskeletal: Negative. Neurological: Negative for dizziness. Psychiatric/Behavioral: Positive for dysphoric mood (improved). The patient is nervous/anxious (improved). All other systems reviewed and are negative. Vitals:    21 1440   BP: 106/72   Site: Left Upper Arm   Position: Sitting   Cuff Size: Medium Adult   Pulse: 75   Resp: 16   Temp: 97.8 °F (36.6 °C)   TempSrc: Temporal   Weight: 120 lb (54.4 kg)   Height: 5' 6\" (1.676 m)       Wt Readings from Last 3 Encounters:   21 120 lb (54.4 kg)   21 125 lb (56.7 kg)   20 117 lb (53.1 kg)       BP Readings from Last 3 Encounters:   21 106/72   21 118/80   20 (!) 112/58       Physical Exam  Vitals signs reviewed.    Constitutional: Subjective   Patient ID: Tri is a 17 year old female.    Chief Complaint   Patient presents with   • Other     CONTRACEPTION     This visit is being performed via video to discuss Other (CONTRACEPTION). She had difficulty with Zoom and we were not able to connect, transitioned to telephone visit.    Clinician Location: [facility] [address] is in [place] and her identity has been established.   She understands that we are limiting office visits due to the coronavirus pandemic and was informed that consent to treat includes permission to submit charges to her insurance. It was also shared that without being seen and evaluated in person, there is a risk that the information and/or assessment may be incomplete or inaccurate.    Tri is a new patient to me, she set appointment today to establish care, but also needs refill on her medications.  She was previously seen by Dr. Rosales, but discharged from practice due to many missed appointments.      She has migraines, which have been mostly hormonal, around her cycle.  She did have relief with Imitrex but has been off due to lack of refills.  She reports her LMP was last week on Wednesday and she was supposed to start her OCP pack  but has no more refill.  Gyne hx is .    We discussed history and no changes since last seen.  Medications and allergies reviewed and reconciled.      Tri will need a physical, labs will be ordered today for her to do prior.  Her vaccines are all up to date.               Patient's medications, allergies, past medical, surgical, social and family histories were reviewed and updated as appropriate.    Review of Systems   All other systems reviewed and are negative.      Objective   Physical Exam  Vitals reviewed: unable to record VS at home.   Constitutional:       Comments: Physical exam limited due to virtual visit.  The following was assessed during the visit:  Tri is  General: She is not in acute distress. Appearance: She is well-developed. HENT:      Head: Normocephalic and atraumatic. Right Ear: Tympanic membrane normal.      Left Ear: Tympanic membrane normal.      Mouth/Throat:      Mouth: Mucous membranes are moist.      Pharynx: No posterior oropharyngeal erythema. Eyes:      Conjunctiva/sclera: Conjunctivae normal.   Cardiovascular:      Rate and Rhythm: Normal rate and regular rhythm. Heart sounds: No murmur. Pulmonary:      Breath sounds: Normal breath sounds. No wheezing. Musculoskeletal:      Right lower leg: No edema. Left lower leg: No edema. Lymphadenopathy:      Cervical: No cervical adenopathy. Neurological:      Mental Status: She is alert. An electronic signature was used to authenticate this note.         Electronically signed by Josias Torres MD on 2/17/2021 at 3:18 PM well-appearing, in no acute distress.  She is AOx3 and speech is clear and appropriate.   Psychiatric:         Mood and Affect: Mood normal.         Behavior: Behavior normal.         Thought Content: Thought content normal.         Assessment   Problem List Items Addressed This Visit        Nervous    Chronic migraine without aura without status migrainosus, not intractable    Relevant Medications    sumatriptan (IMITREX) 100 MG tablet      Other Visit Diagnoses     Encounter to establish care with new doctor    -  Primary    Encounter for medication refill        Laboratory examination ordered as part of a routine general medical examination        Relevant Orders    CBC WITH DIFFERENTIAL    LIPID PANEL WITHOUT REFLEX    BASIC METABOLIC PANEL    THYROID STIMULATING HORMONE    GLYCOHEMOGLOBIN    VITAMIN D -25 HYDROXY    URINALYSIS & REFLEX MICROSCOPIC         Medication refilled.  Instructed to use back up contraception for 30 days since she missed first few days of OCP.      Make appt. For physical soon.  Will follow up on labs.   5-10 minutes were spent in this encounter.

## 2021-04-16 ENCOUNTER — OFFICE VISIT (OUTPATIENT)
Dept: FAMILY MEDICINE CLINIC | Age: 21
End: 2021-04-16
Payer: COMMERCIAL

## 2021-04-16 VITALS
DIASTOLIC BLOOD PRESSURE: 60 MMHG | TEMPERATURE: 96.8 F | HEART RATE: 75 BPM | OXYGEN SATURATION: 99 % | HEIGHT: 66 IN | BODY MASS INDEX: 18.96 KG/M2 | WEIGHT: 118 LBS | SYSTOLIC BLOOD PRESSURE: 108 MMHG

## 2021-04-16 DIAGNOSIS — J30.9 ALLERGIC RHINITIS, UNSPECIFIED SEASONALITY, UNSPECIFIED TRIGGER: ICD-10-CM

## 2021-04-16 DIAGNOSIS — J02.9 ACUTE PHARYNGITIS, UNSPECIFIED ETIOLOGY: Primary | ICD-10-CM

## 2021-04-16 LAB — S PYO AG THROAT QL: NORMAL

## 2021-04-16 PROCEDURE — 99213 OFFICE O/P EST LOW 20 MIN: CPT | Performed by: FAMILY MEDICINE

## 2021-04-16 PROCEDURE — 87880 STREP A ASSAY W/OPTIC: CPT | Performed by: FAMILY MEDICINE

## 2021-04-16 ASSESSMENT — ENCOUNTER SYMPTOMS
SORE THROAT: 1
GASTROINTESTINAL NEGATIVE: 1
COUGH: 0

## 2021-04-16 NOTE — PROGRESS NOTES
2021    Ney Robertson (:  2000) is a 24 y.o. female,Established patient, here for evaluation of the following chief complaint(s):  Follow-up (Yolanda Bailey is here to be seen for tonsil stones )      ASSESSMENT/PLAN:    1. Acute pharyngitis, unspecified etiology  -     POCT rapid strep A  2. Allergic rhinitis, unspecified seasonality, unspecified trigger    Will have her start OTC Zyrtec 10mg daily for allergies and postnasal drip. Her sore throat will likely resolve with this. Follow up if not better      SUBJECTIVE/OBJECTIVE:    HPI     Here with a 1 day h/o sore throat. She thought she saw a white spot in the back of her throat. Has had sneezing and itchy/watery eyes. Some postnasal drip noted. No fever, chills, body aches. No headache. No known exposure to illness. Nonsmoker. Review of Systems   Constitutional: Negative for fatigue and fever. HENT: Positive for postnasal drip, sneezing and sore throat. Negative for congestion and ear pain. Respiratory: Negative for cough. Cardiovascular: Negative. Gastrointestinal: Negative. Musculoskeletal: Negative for arthralgias and myalgias. Neurological: Negative for headaches. All other systems reviewed and are negative. Vitals:    21 1248   BP: 108/60   Site: Left Upper Arm   Position: Sitting   Cuff Size: Small Adult   Pulse: 75   Temp: 96.8 °F (36 °C)   TempSrc: Tympanic   SpO2: 99%   Weight: 118 lb (53.5 kg)   Height: 5' 6\" (1.676 m)       Wt Readings from Last 3 Encounters:   21 118 lb (53.5 kg)   21 120 lb (54.4 kg)   21 125 lb (56.7 kg)       BP Readings from Last 3 Encounters:   21 108/60   21 106/72   21 118/80       Physical Exam  Vitals signs reviewed. Constitutional:       General: She is not in acute distress. Appearance: She is well-developed. HENT:      Head: Normocephalic and atraumatic.       Right Ear: Tympanic membrane normal.      Left Ear: Tympanic membrane normal.      Mouth/Throat:      Mouth: Mucous membranes are moist.      Pharynx: Posterior oropharyngeal erythema present. No oropharyngeal exudate. Eyes:      Conjunctiva/sclera:      Right eye: Right conjunctiva is injected. Left eye: Left conjunctiva is injected. Cardiovascular:      Rate and Rhythm: Normal rate and regular rhythm. Heart sounds: No murmur. Pulmonary:      Breath sounds: Normal breath sounds. No wheezing. Musculoskeletal:      Right lower leg: No edema. Left lower leg: No edema. Lymphadenopathy:      Cervical: No cervical adenopathy. Neurological:      Mental Status: She is alert. Results for POC orders placed in visit on 04/16/21   POCT rapid strep A   Result Value Ref Range    Strep A Ag None Detected None Detected               An electronic signature was used to authenticate this note.         Electronically signed by Rogelio Robledo MD on 4/16/2021 at 1:42 PM

## 2021-07-06 DIAGNOSIS — R45.7 EMOTIONAL STRESS: ICD-10-CM

## 2021-07-08 RX ORDER — ESCITALOPRAM OXALATE 10 MG/1
TABLET ORAL
Qty: 30 TABLET | Refills: 11 | Status: SHIPPED | OUTPATIENT
Start: 2021-07-08 | End: 2022-07-05

## 2021-07-08 NOTE — TELEPHONE ENCOUNTER
Request sent from [facility] for refill of lexapro 10 mg qd. Last seen 4/16/21, next appt 8/18/21. Medication verified   Order pended.

## 2021-08-04 ENCOUNTER — OFFICE VISIT (OUTPATIENT)
Dept: FAMILY MEDICINE CLINIC | Age: 21
End: 2021-08-04
Payer: COMMERCIAL

## 2021-08-04 VITALS
HEART RATE: 72 BPM | RESPIRATION RATE: 16 BRPM | BODY MASS INDEX: 18.69 KG/M2 | DIASTOLIC BLOOD PRESSURE: 78 MMHG | SYSTOLIC BLOOD PRESSURE: 108 MMHG | WEIGHT: 115.8 LBS

## 2021-08-04 DIAGNOSIS — R45.7 EMOTIONAL STRESS: ICD-10-CM

## 2021-08-04 DIAGNOSIS — H61.21 EXCESSIVE CERUMEN IN RIGHT EAR CANAL: Primary | ICD-10-CM

## 2021-08-04 PROCEDURE — 99213 OFFICE O/P EST LOW 20 MIN: CPT | Performed by: FAMILY MEDICINE

## 2021-08-04 SDOH — ECONOMIC STABILITY: FOOD INSECURITY: WITHIN THE PAST 12 MONTHS, YOU WORRIED THAT YOUR FOOD WOULD RUN OUT BEFORE YOU GOT MONEY TO BUY MORE.: NEVER TRUE

## 2021-08-04 SDOH — ECONOMIC STABILITY: FOOD INSECURITY: WITHIN THE PAST 12 MONTHS, THE FOOD YOU BOUGHT JUST DIDN'T LAST AND YOU DIDN'T HAVE MONEY TO GET MORE.: NEVER TRUE

## 2021-08-04 ASSESSMENT — ENCOUNTER SYMPTOMS
SINUS PRESSURE: 0
GASTROINTESTINAL NEGATIVE: 1
SINUS PAIN: 0
RESPIRATORY NEGATIVE: 1

## 2021-08-04 ASSESSMENT — SOCIAL DETERMINANTS OF HEALTH (SDOH): HOW HARD IS IT FOR YOU TO PAY FOR THE VERY BASICS LIKE FOOD, HOUSING, MEDICAL CARE, AND HEATING?: NOT HARD AT ALL

## 2021-08-04 NOTE — PROGRESS NOTES
2021    Macarena Andrade (:  2000) is a 24 y.o. female,Established patient, here for evaluation of the following chief complaint(s):  6 Month Follow-Up (med follow-up for lexapro, doing well no new concerns )      ASSESSMENT/PLAN:    1. Excessive cerumen in right ear canal  2. Emotional stress    Continue Lexapro at current dose    Healthy lifestyle with a well-balanced diet and regular exercise suggested    Warm water irrigation of right ear today    Return in about 6 months (around 2022). SUBJECTIVE/OBJECTIVE:    HPI    Patient here for follow-up of emotional stress. She has done well on Lexapro 10 mg daily. She states that she only has 1 to 2 days/month where she struggles with low mood and lack of motivation. On those days she increases her activity with exercise and personal time in order to manage. She feels like the medication has helped significantly. She is starting a new job soon as a . She takes Lexapro daily and denies side effects. Complains of a clogged sensation in the right ear without pain. Review of Systems   Constitutional: Negative for fatigue and fever. HENT: Positive for hearing loss. Negative for congestion, ear discharge, ear pain, sinus pressure and sinus pain. Respiratory: Negative. Cardiovascular: Negative. Gastrointestinal: Negative. Psychiatric/Behavioral: Positive for dysphoric mood (Improved). The patient is nervous/anxious (Improved). All other systems reviewed and are negative. Vitals:    21 1503   BP: 108/78   Pulse: 72   Resp: 16   Weight: 115 lb 12.8 oz (52.5 kg)       Wt Readings from Last 3 Encounters:   21 115 lb 12.8 oz (52.5 kg)   21 118 lb (53.5 kg)   21 120 lb (54.4 kg)       BP Readings from Last 3 Encounters:   21 108/78   21 108/60   21 106/72       Physical Exam  Vitals reviewed. Constitutional:       General: She is not in acute distress.

## 2021-08-04 NOTE — PROGRESS NOTES
Per Dr. Teran Odor order performed a warm water right ear rinse. Used a cerumen spoon. Moderate cerumen in the right ear. TM was white and visualized. Pt tolerated well.

## 2022-02-08 ENCOUNTER — OFFICE VISIT (OUTPATIENT)
Dept: FAMILY MEDICINE CLINIC | Age: 22
End: 2022-02-08
Payer: COMMERCIAL

## 2022-02-08 VITALS
BODY MASS INDEX: 18.72 KG/M2 | WEIGHT: 116 LBS | DIASTOLIC BLOOD PRESSURE: 64 MMHG | RESPIRATION RATE: 16 BRPM | HEART RATE: 92 BPM | SYSTOLIC BLOOD PRESSURE: 114 MMHG

## 2022-02-08 DIAGNOSIS — R45.7 EMOTIONAL STRESS: Primary | ICD-10-CM

## 2022-02-08 PROCEDURE — 99213 OFFICE O/P EST LOW 20 MIN: CPT | Performed by: FAMILY MEDICINE

## 2022-02-08 NOTE — PROGRESS NOTES
2022    Diana Smith (:  2000) is a 20 y.o. female, Established patient, here for evaluation of the following chief complaint(s):  6 Month Follow-Up (No concerns )      ASSESSMENT/PLAN:    1. Emotional stress    Continue Lexapro 10 mg daily    Appropriate self-care with healthy diet, regular exercise, and adequate rest suggested    Return in about 6 months (around 2022). SUBJECTIVE/OBJECTIVE:    HPI    Patient here today for follow-up of emotional stress and anxiety. Her symptoms are significantly improved since she started taking Lexapro 10 mg daily. She takes medication as directed and denies side effects. She feels that the medication has been very effective for her. She has started a job as a  and is performing well. She feels that she has adjusted well to her new responsibilities. She is sleeping well. Overall, she is happy with her progress and would like to continue her current medication. Review of Systems   Constitutional: Negative. HENT: Negative. Respiratory: Negative. Cardiovascular: Negative. Genitourinary: Negative. Psychiatric/Behavioral: Negative for agitation, dysphoric mood and sleep disturbance. The patient is not nervous/anxious. All other systems reviewed and are negative. Vitals:    22 1539   BP: 114/64   Pulse: 92   Resp: 16   Weight: 116 lb (52.6 kg)       Wt Readings from Last 3 Encounters:   22 116 lb (52.6 kg)   21 115 lb 12.8 oz (52.5 kg)   21 118 lb (53.5 kg)       BP Readings from Last 3 Encounters:   22 114/64   21 108/78   21 108/60       Physical Exam  Vitals reviewed. Constitutional:       General: She is not in acute distress. Appearance: She is well-developed. HENT:      Head: Normocephalic and atraumatic.       Right Ear: Tympanic membrane normal.      Left Ear: Tympanic membrane normal.      Mouth/Throat:      Mouth: Mucous membranes are moist. Pharynx: No posterior oropharyngeal erythema. Eyes:      Conjunctiva/sclera: Conjunctivae normal.   Cardiovascular:      Rate and Rhythm: Normal rate and regular rhythm. Heart sounds: No murmur heard. Pulmonary:      Breath sounds: Normal breath sounds. No wheezing. Musculoskeletal:      Right lower leg: No edema. Left lower leg: No edema. Lymphadenopathy:      Cervical: No cervical adenopathy. Neurological:      Mental Status: She is alert. An electronic signature was used to authenticate this note.         Electronically signed by Royal Fatima MD on 2/8/2022 at 4:55 PM

## 2022-02-09 ASSESSMENT — ENCOUNTER SYMPTOMS: RESPIRATORY NEGATIVE: 1

## 2022-07-04 DIAGNOSIS — R45.7 EMOTIONAL STRESS: ICD-10-CM

## 2022-07-05 RX ORDER — ESCITALOPRAM OXALATE 10 MG/1
TABLET ORAL
Qty: 90 TABLET | Refills: 3 | Status: SHIPPED | OUTPATIENT
Start: 2022-07-05

## 2022-08-01 ENCOUNTER — OFFICE VISIT (OUTPATIENT)
Dept: FAMILY MEDICINE CLINIC | Age: 22
End: 2022-08-01
Payer: COMMERCIAL

## 2022-08-01 VITALS
RESPIRATION RATE: 16 BRPM | BODY MASS INDEX: 18.35 KG/M2 | HEIGHT: 66 IN | WEIGHT: 114.2 LBS | TEMPERATURE: 97.9 F | HEART RATE: 72 BPM | DIASTOLIC BLOOD PRESSURE: 60 MMHG | SYSTOLIC BLOOD PRESSURE: 120 MMHG

## 2022-08-01 DIAGNOSIS — R45.7 EMOTIONAL STRESS: Primary | ICD-10-CM

## 2022-08-01 PROCEDURE — 1036F TOBACCO NON-USER: CPT | Performed by: FAMILY MEDICINE

## 2022-08-01 PROCEDURE — G8419 CALC BMI OUT NRM PARAM NOF/U: HCPCS | Performed by: FAMILY MEDICINE

## 2022-08-01 PROCEDURE — G8427 DOCREV CUR MEDS BY ELIG CLIN: HCPCS | Performed by: FAMILY MEDICINE

## 2022-08-01 PROCEDURE — 99213 OFFICE O/P EST LOW 20 MIN: CPT | Performed by: FAMILY MEDICINE

## 2022-08-01 ASSESSMENT — ENCOUNTER SYMPTOMS
SHORTNESS OF BREATH: 0
GASTROINTESTINAL NEGATIVE: 1

## 2022-08-01 NOTE — PROGRESS NOTES
2022    Christa Lewis (:  2000) is a 20 y.o. female, Established patient, here for evaluation of the following chief complaint(s):  6 Month Follow-Up (No concerns at this time.)      ASSESSMENT/PLAN:    1. Emotional stress      Patient will decrease Lexapro to 5 mg daily x2 weeks and then discontinue. She may restart the medication if her symptoms recur. She is encouraged to schedule Pap testing with a gynecologist    Follow-up as needed    SUBJECTIVE/OBJECTIVE:    HPI    Patient today for follow-up of emotional stress and treatment with Lexapro. She states that her mood is good and she denies significant anxiety. The medication has worked well for her overall and she is wondering whether she still needs it. 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 She continues to take Lexapro daily and denies side effects. She runs for exercise. Non-smoker. BMI 18.43. Review of Systems   Constitutional:  Negative for fatigue and unexpected weight change. HENT: Negative. Respiratory:  Negative for shortness of breath. Cardiovascular:  Negative for chest pain. Gastrointestinal: Negative. Genitourinary: Negative. Negative for menstrual problem. Neurological:  Negative for dizziness and headaches. Psychiatric/Behavioral:  Negative for dysphoric mood and sleep disturbance. The patient is not nervous/anxious. All other systems reviewed and are negative. Vitals:    22 0954   BP: 120/60   Pulse: 72   Resp: 16   Temp: 97.9 °F (36.6 °C)   TempSrc: Oral   Weight: 114 lb 3.2 oz (51.8 kg)   Height: 5' 6\" (1.676 m)       Wt Readings from Last 3 Encounters:   22 114 lb 3.2 oz (51.8 kg)   22 116 lb (52.6 kg)   21 115 lb 12.8 oz (52.5 kg)       BP Readings from Last 3 Encounters:   22 120/60   22 114/64   21 108/78       Physical Exam  Vitals reviewed.    Constitutional: General: She is not in acute distress. Appearance: She is well-developed. HENT:      Head: Normocephalic and atraumatic. Right Ear: Tympanic membrane normal.      Left Ear: Tympanic membrane normal.      Mouth/Throat:      Mouth: Mucous membranes are moist.      Pharynx: No posterior oropharyngeal erythema. Eyes:      Conjunctiva/sclera: Conjunctivae normal.   Cardiovascular:      Rate and Rhythm: Normal rate and regular rhythm. Heart sounds: No murmur heard. Pulmonary:      Breath sounds: Normal breath sounds. No wheezing. Musculoskeletal:      Right lower leg: No edema. Left lower leg: No edema. Lymphadenopathy:      Cervical: No cervical adenopathy. Neurological:      Mental Status: She is alert. An electronic signature was used to authenticate this note.         Electronically signed by Deedee Chino MD on 8/1/2022 at 10:15 AM

## 2023-02-09 ENCOUNTER — TELEPHONE (OUTPATIENT)
Dept: FAMILY MEDICINE CLINIC | Age: 23
End: 2023-02-09

## 2023-02-09 NOTE — TELEPHONE ENCOUNTER
[facility] [employer] [facility]. I called patient to get more info regarding her chest pains and to get her in today or tomorrow. She stated it has been going on for awhile and she denied an earlier appointment due to work. She did move her appointment up to the best day and time for her.  Per her request we scheduled her for 2/15/23 at 1pm.

## 2023-02-15 ENCOUNTER — OFFICE VISIT (OUTPATIENT)
Dept: FAMILY MEDICINE CLINIC | Age: 23
End: 2023-02-15
Payer: COMMERCIAL

## 2023-02-15 VITALS
SYSTOLIC BLOOD PRESSURE: 102 MMHG | RESPIRATION RATE: 16 BRPM | HEART RATE: 96 BPM | WEIGHT: 121 LBS | BODY MASS INDEX: 19.53 KG/M2 | DIASTOLIC BLOOD PRESSURE: 68 MMHG

## 2023-02-15 DIAGNOSIS — R00.2 PALPITATIONS: ICD-10-CM

## 2023-02-15 DIAGNOSIS — R07.9 CHEST PAIN, UNSPECIFIED TYPE: Primary | ICD-10-CM

## 2023-02-15 PROCEDURE — G8484 FLU IMMUNIZE NO ADMIN: HCPCS | Performed by: NURSE PRACTITIONER

## 2023-02-15 PROCEDURE — G8427 DOCREV CUR MEDS BY ELIG CLIN: HCPCS | Performed by: NURSE PRACTITIONER

## 2023-02-15 PROCEDURE — 1036F TOBACCO NON-USER: CPT | Performed by: NURSE PRACTITIONER

## 2023-02-15 PROCEDURE — 93000 ELECTROCARDIOGRAM COMPLETE: CPT | Performed by: NURSE PRACTITIONER

## 2023-02-15 PROCEDURE — 99214 OFFICE O/P EST MOD 30 MIN: CPT | Performed by: NURSE PRACTITIONER

## 2023-02-15 PROCEDURE — G8420 CALC BMI NORM PARAMETERS: HCPCS | Performed by: NURSE PRACTITIONER

## 2023-02-15 SDOH — ECONOMIC STABILITY: HOUSING INSECURITY
IN THE LAST 12 MONTHS, WAS THERE A TIME WHEN YOU DID NOT HAVE A STEADY PLACE TO SLEEP OR SLEPT IN A SHELTER (INCLUDING NOW)?: NO

## 2023-02-15 SDOH — ECONOMIC STABILITY: INCOME INSECURITY: HOW HARD IS IT FOR YOU TO PAY FOR THE VERY BASICS LIKE FOOD, HOUSING, MEDICAL CARE, AND HEATING?: NOT HARD AT ALL

## 2023-02-15 SDOH — ECONOMIC STABILITY: FOOD INSECURITY: WITHIN THE PAST 12 MONTHS, YOU WORRIED THAT YOUR FOOD WOULD RUN OUT BEFORE YOU GOT MONEY TO BUY MORE.: NEVER TRUE

## 2023-02-15 SDOH — ECONOMIC STABILITY: FOOD INSECURITY: WITHIN THE PAST 12 MONTHS, THE FOOD YOU BOUGHT JUST DIDN'T LAST AND YOU DIDN'T HAVE MONEY TO GET MORE.: NEVER TRUE

## 2023-02-15 ASSESSMENT — PATIENT HEALTH QUESTIONNAIRE - PHQ9
SUM OF ALL RESPONSES TO PHQ QUESTIONS 1-9: 0
2. FEELING DOWN, DEPRESSED OR HOPELESS: 0
SUM OF ALL RESPONSES TO PHQ QUESTIONS 1-9: 0
SUM OF ALL RESPONSES TO PHQ9 QUESTIONS 1 & 2: 0
1. LITTLE INTEREST OR PLEASURE IN DOING THINGS: 0

## 2023-02-15 ASSESSMENT — ENCOUNTER SYMPTOMS: SHORTNESS OF BREATH: 0

## 2023-02-15 NOTE — PROGRESS NOTES
[facility]  [address] [facility] [address]  Dept: [phone]  Loc: [phone]      Visit Date: 2/15/2023    Jakub Elaine is a 24 y.o. female who presents today for:  Chief Complaint   Patient presents with    Chest Pain     C/O chest pain, elevated heart rate x couple years. Getting worse and more frequent the last month. No SOB. No family history of heart issues. HPI:     190's while exercising - was alerted by her watch a couple of months ago while sleeping - HR was 150's. Chest pain off and on - occurs an random    No SOB    Palpitations occur at random - occurs daily    Denies vision changes, sob, n/v/d    HPI  Health Maintenance   Topic Date Due    COVID-19 Vaccine (1) Never done    HIV screen  Never done    Chlamydia/GC screen  Never done    Hepatitis C screen  Never done    Pap smear  Never done    Flu vaccine (1) 08/01/2022    Depression Screen  02/08/2023    DTaP/Tdap/Td vaccine (5 - Td or Tdap) 07/21/2024    Hib vaccine  Completed    HPV vaccine  Completed    Meningococcal (ACWY) vaccine  Completed    Hepatitis A vaccine  Aged Out    Pneumococcal 0-64 years Vaccine  Aged Out    Varicella vaccine  Discontinued     Past Medical History:   Diagnosis Date    Family history of Infante syndrome       History reviewed. No pertinent surgical history. Family History   Problem Relation Age of Onset    Diabetes Sister     Cancer Maternal Grandmother      Social History     Tobacco Use    Smoking status: Never    Smokeless tobacco: Never   Substance Use Topics    Alcohol use: No      Current Outpatient Medications   Medication Sig Dispense Refill    BIOTIN PO Take by mouth       Multiple Vitamin (MULTI-DAY PO) Take by mouth        No current facility-administered medications for this visit. No Known Allergies    Subjective:    Review of Systems   Constitutional:  Negative for fatigue. Respiratory:  Negative for shortness of breath. Cardiovascular:  Positive for chest pain and palpitations. Objective:     Vitals:    02/15/23 1301   BP: 102/68   Pulse: 96   Resp: 16   Weight: 121 lb (54.9 kg)       Body mass index is 19.53 kg/m². @HXYUIUQ(2)@  BP Readings from Last 3 Encounters:   02/15/23 102/68   08/01/22 120/60   02/08/22 114/64     Physical Exam  Constitutional:       General: She is not in acute distress. Appearance: Normal appearance. She is well-developed. She is not ill-appearing or diaphoretic. HENT:      Head: Normocephalic and atraumatic. Right Ear: Hearing and external ear normal. No decreased hearing noted. Left Ear: Hearing and external ear normal. No decreased hearing noted. Nose: Nose normal. No nasal deformity. Eyes:      General:         Right eye: No discharge. Left eye: No discharge. Conjunctiva/sclera: Conjunctivae normal.   Cardiovascular:      Rate and Rhythm: Normal rate and regular rhythm. Pulmonary:      Effort: Pulmonary effort is normal. No respiratory distress. Breath sounds: Normal breath sounds. No wheezing. Abdominal:      General: There is no distension. Tenderness: There is no guarding. Musculoskeletal:         General: No tenderness or deformity. Normal range of motion. Cervical back: Normal range of motion and neck supple. Skin:     Coloration: Skin is not pale. Findings: No erythema or rash (On exposed areas). Neurological:      General: No focal deficit present. Mental Status: She is alert and oriented to person, place, and time. Gait: Gait normal.   Psychiatric:         Mood and Affect: Mood normal.         Speech: Speech normal.         Behavior: Behavior normal.         Thought Content:  Thought content normal.         Judgment: Judgment normal.       Lab Results   Component Value Date    WBC 5.3 02/08/2021    HGB 13.2 02/08/2021    HCT 38.9 02/08/2021     02/08/2021    AST 20 01/22/2021     01/22/2021    K 4.6 01/22/2021     01/22/2021    CREATININE 0.85 01/22/2021    BUN 11 01/22/2021    CO2 26 01/22/2021    TSH 1.17 01/22/2021    CALCIUM 9.4 01/22/2021     Assessment:       Diagnosis Orders   1. Chest pain, unspecified type  EKG 12 lead    CBC with Auto Differential    Comprehensive Metabolic Panel    TSH with Reflex    Iron and TIBC    Longterm Continuous Cardiac Event Monitor (ZIO)      2. Palpitations  CBC with Auto Differential    Comprehensive Metabolic Panel    TSH with Reflex    Iron and TIBC    Longterm Continuous Cardiac Event Monitor (ZIO)          Plan:   EKG in the office today  We will get some blood work  Ordered a Holter monitor    Emergency department if symptoms worsen or new symptoms develop  Return if symptoms worsen or fail to improve. Orders Placed:  Orders Placed This Encounter   Procedures    CBC with Auto Differential    Comprehensive Metabolic Panel    TSH with Reflex    Iron and TIBC    Longterm Continuous Cardiac Event Monitor (ZIO)    EKG 12 lead     Medications Prescribed:  No orders of the defined types were placed in this encounter. No future appointments. Patient given educational materials - see patient instructions. Discussed use, benefit, and side effects of prescribedmedications. All patient questions answered. Pt voiced understanding. Reviewed health maintenance. Instructed to continue current medications, diet and exercise. Patient agreed with treatment plan. Follow up as directed.     Electronically signed by Gracy Lennox, APRN - CNP on 2/15/2023 at 2:08 PM

## 2023-02-15 NOTE — LETTER
[address] [address]  Phone: [phone]  Fax: Cristian Avendaño, APRN - CNP        February 15, 2023     Patient: Cindy Gomez   YOB: 2000   Date of Visit: 2/15/2023       To Whom It May Concern: It is my medical opinion that Sparkle Leong may return to work on 02/16/2023. If you have any questions or concerns, please don't hesitate to call.     Sincerely,        [place] KANIKA Warren - CNP

## 2023-02-28 ENCOUNTER — HOSPITAL ENCOUNTER (OUTPATIENT)
Dept: NON INVASIVE DIAGNOSTICS | Age: 23
Discharge: HOME | End: 2023-02-28
Payer: COMMERCIAL

## 2023-02-28 DIAGNOSIS — R07.9 CHEST PAIN, UNSPECIFIED TYPE: ICD-10-CM

## 2023-02-28 DIAGNOSIS — R00.2 PALPITATIONS: ICD-10-CM

## 2023-02-28 PROCEDURE — 93242 EXT ECG>48HR<7D RECORDING: CPT

## 2023-02-28 NOTE — PROCEDURES
3 Day Continuous Cardiac Monitor was applied to patient. Instructions were given and skin/monitor prep and application was demonstrated. Patient was instructed to remove monitor on 3/3 at 1615 and mail back to [facility] [address].

## 2024-04-09 ENCOUNTER — PATIENT MESSAGE (OUTPATIENT)
Dept: FAMILY MEDICINE CLINIC | Age: 24
End: 2024-04-09

## 2024-04-09 NOTE — TELEPHONE ENCOUNTER
From: Deb Lamar  To: Dr. Rosa Alvarado  Sent: 4/9/2024 10:04 AM EDT  Subject: Appointment Request    Appointment Request From: Deb Lamar    With Provider: Rosa Alvarado MD [[facility]]    Preferred Date Range: Any date 4/12/2024 or later    Preferred Times: Friday Afternoon    Reason for visit: Office Visit    Comments:  Toe nail fungas. Preferred appt time around noon.

## 2024-04-12 ENCOUNTER — OFFICE VISIT (OUTPATIENT)
Dept: FAMILY MEDICINE CLINIC | Age: 24
End: 2024-04-12
Payer: COMMERCIAL

## 2024-04-12 VITALS
RESPIRATION RATE: 16 BRPM | BODY MASS INDEX: 19.5 KG/M2 | HEART RATE: 86 BPM | SYSTOLIC BLOOD PRESSURE: 104 MMHG | WEIGHT: 120.8 LBS | DIASTOLIC BLOOD PRESSURE: 64 MMHG | TEMPERATURE: 97.7 F

## 2024-04-12 DIAGNOSIS — B35.1 ONYCHOMYCOSIS OF TOENAIL: Primary | ICD-10-CM

## 2024-04-12 PROCEDURE — 99213 OFFICE O/P EST LOW 20 MIN: CPT | Performed by: FAMILY MEDICINE

## 2024-04-12 PROCEDURE — 1036F TOBACCO NON-USER: CPT | Performed by: FAMILY MEDICINE

## 2024-04-12 PROCEDURE — G8427 DOCREV CUR MEDS BY ELIG CLIN: HCPCS | Performed by: FAMILY MEDICINE

## 2024-04-12 PROCEDURE — G8420 CALC BMI NORM PARAMETERS: HCPCS | Performed by: FAMILY MEDICINE

## 2024-04-12 SDOH — ECONOMIC STABILITY: INCOME INSECURITY: HOW HARD IS IT FOR YOU TO PAY FOR THE VERY BASICS LIKE FOOD, HOUSING, MEDICAL CARE, AND HEATING?: NOT HARD AT ALL

## 2024-04-12 SDOH — ECONOMIC STABILITY: FOOD INSECURITY: WITHIN THE PAST 12 MONTHS, THE FOOD YOU BOUGHT JUST DIDN'T LAST AND YOU DIDN'T HAVE MONEY TO GET MORE.: NEVER TRUE

## 2024-04-12 SDOH — ECONOMIC STABILITY: FOOD INSECURITY: WITHIN THE PAST 12 MONTHS, YOU WORRIED THAT YOUR FOOD WOULD RUN OUT BEFORE YOU GOT MONEY TO BUY MORE.: NEVER TRUE

## 2024-04-12 ASSESSMENT — PATIENT HEALTH QUESTIONNAIRE - PHQ9
SUM OF ALL RESPONSES TO PHQ QUESTIONS 1-9: 0
SUM OF ALL RESPONSES TO PHQ QUESTIONS 1-9: 0
2. FEELING DOWN, DEPRESSED OR HOPELESS: NOT AT ALL
1. LITTLE INTEREST OR PLEASURE IN DOING THINGS: NOT AT ALL
SUM OF ALL RESPONSES TO PHQ QUESTIONS 1-9: 0
SUM OF ALL RESPONSES TO PHQ QUESTIONS 1-9: 0
SUM OF ALL RESPONSES TO PHQ9 QUESTIONS 1 & 2: 0

## 2024-04-12 ASSESSMENT — ENCOUNTER SYMPTOMS: RESPIRATORY NEGATIVE: 1

## 2024-04-12 NOTE — PROGRESS NOTES
2024    Deb Lamar (:  2000) is a 24 y.o. female, Established patient, here for evaluation of the following chief complaint(s):  Nail Problem (Right foot, pt states she's had it about a year)      ASSESSMENT/PLAN:    1. Onychomycosis of toenail  -     Efinaconazole 10 % SOLN; Apply topically daily, Disp-8 mL, R-2Print    Patient declines oral Lamisil and opts for a trial of Jublia topical solution.  She will apply the medication topically to the nail on a daily basis for 48 weeks.    Follow-up if not improved    SUBJECTIVE/OBJECTIVE:    HPI    Patient today with a 1 year history of a thickened and yellow toenail on the right foot.  She has tried various home remedies including tea tree oil without improvement of her symptoms.  She does not like how the toenail looks.  It is not painful but she has noticed some splitting of the nail and splintering.  She is otherwise well.    Review of Systems   Constitutional: Negative.    HENT: Negative.     Respiratory: Negative.     Genitourinary: Negative.    Skin:         Toenail yellow, thick   All other systems reviewed and are negative.          Vitals:    24 1135   BP: 104/64   Pulse: 86   Resp: 16   Temp: 97.7 °F (36.5 °C)   TempSrc: Oral   Weight: 54.8 kg (120 lb 12.8 oz)       Wt Readings from Last 3 Encounters:   24 54.8 kg (120 lb 12.8 oz)   02/15/23 54.9 kg (121 lb)   22 51.8 kg (114 lb 3.2 oz)       BP Readings from Last 3 Encounters:   24 104/64   02/15/23 102/68   22 120/60       Physical Exam  Vitals reviewed.   Constitutional:       General: She is not in acute distress.  Musculoskeletal:        Feet:    Neurological:      Mental Status: She is alert.               An electronic signature was used to authenticate this note.        Electronically signed by Rosa Alvarado MD on 2024 at 12:00 PM

## 2025-08-28 ENCOUNTER — PATIENT MESSAGE (OUTPATIENT)
Dept: FAMILY MEDICINE CLINIC | Age: 25
End: 2025-08-28

## 2025-08-28 DIAGNOSIS — B35.1 ONYCHOMYCOSIS OF TOENAIL: Primary | ICD-10-CM

## 2025-09-04 LAB
ALT SERPL-CCNC: 10 U/L (ref 5–33)
AST SERPL-CCNC: 16 U/L (ref 9–40)